# Patient Record
Sex: MALE | Race: WHITE
[De-identification: names, ages, dates, MRNs, and addresses within clinical notes are randomized per-mention and may not be internally consistent; named-entity substitution may affect disease eponyms.]

---

## 2022-07-07 ENCOUNTER — DASHBOARD ENCOUNTERS (OUTPATIENT)
Age: 27
End: 2022-07-07

## 2022-07-07 ENCOUNTER — OFFICE VISIT (OUTPATIENT)
Dept: URBAN - METROPOLITAN AREA CLINIC 128 | Facility: CLINIC | Age: 27
End: 2022-07-07
Payer: COMMERCIAL

## 2022-07-07 ENCOUNTER — WEB ENCOUNTER (OUTPATIENT)
Dept: URBAN - METROPOLITAN AREA CLINIC 128 | Facility: CLINIC | Age: 27
End: 2022-07-07

## 2022-07-07 VITALS
TEMPERATURE: 97.9 F | HEIGHT: 69 IN | DIASTOLIC BLOOD PRESSURE: 76 MMHG | WEIGHT: 146 LBS | SYSTOLIC BLOOD PRESSURE: 112 MMHG | BODY MASS INDEX: 21.62 KG/M2

## 2022-07-07 DIAGNOSIS — A05.8 E. COLI COLITIS: ICD-10-CM

## 2022-07-07 PROCEDURE — 99203 OFFICE O/P NEW LOW 30 MIN: CPT | Performed by: INTERNAL MEDICINE

## 2022-07-07 NOTE — HPI-TODAY'S VISIT:
Patient presents as a referral from the [facility] ED for reassessment of abdominal pain/diarrhea.  Patient had gone to the [facility] ED on 6/19/22 for these symptoms, which started right after coming back from a trip to the [place].  Records reviewed.  Patient had stool studies done that showed that he had Enterotoxigenic E. coli infection; he actually had received IV antibiotics in the ED along with a prescription for oral antibiotics, which he completed.  Feels well today, although he still has some urgency - has 4-5 soft BMs/day with occasional blood.